# Patient Record
Sex: FEMALE | Race: WHITE | URBAN - METROPOLITAN AREA
[De-identification: names, ages, dates, MRNs, and addresses within clinical notes are randomized per-mention and may not be internally consistent; named-entity substitution may affect disease eponyms.]

---

## 2017-03-22 ENCOUNTER — OFFICE VISIT (OUTPATIENT)
Dept: PHYSICAL MEDICINE AND REHAB | Facility: CLINIC | Age: 47
End: 2017-03-22

## 2017-03-22 VITALS
HEART RATE: 74 BPM | WEIGHT: 143 LBS | BODY MASS INDEX: 25.34 KG/M2 | RESPIRATION RATE: 20 BRPM | TEMPERATURE: 98.4 F | SYSTOLIC BLOOD PRESSURE: 118 MMHG | OXYGEN SATURATION: 96 % | DIASTOLIC BLOOD PRESSURE: 43 MMHG | HEIGHT: 63 IN

## 2017-03-22 DIAGNOSIS — G24.1 DYSTONIA, TORSION, FRAGMENTS OF: ICD-10-CM

## 2017-03-22 DIAGNOSIS — G24.3 SPASMODIC TORTICOLLIS: Primary | ICD-10-CM

## 2017-03-22 NOTE — PROGRESS NOTES
"BOTULINUM TOXIN PROCEDURE NOTE    Chief Complaint   Patient presents with     RECHECK     UMP- MYOBLOCK-CERVICAL DYSTONIA       /43 (BP Location: Left arm, Patient Position: Chair, Cuff Size: Adult Large)  Pulse 74  Temp 98.4  F (36.9  C) (Oral)  Resp 20  Ht 1.6 m (5' 3\")  Wt 64.9 kg (143 lb)  SpO2 96%  Breastfeeding? No  BMI 25.33 kg/m2      Current Outpatient Prescriptions:      RimabotulinumtoxinB (MYOBLOC IM), Inject 7,500 Units into the muscle once, Disp: , Rfl:      Allergies   Allergen Reactions     No Known Allergies         PHYSICAL EXAM:  HEAD, NECK AND TRUNK DYSTONIC PATTERN:    Tremor: Not Observed   Head & Neck Flexion: Present (this is a change from her previous pattern which included more retrocollis)  Forward Head: Present - Mild  Lateral Bend: To the Right  Head & Neck Rotation: Right - significant.      HPI:    Patient denies new medical diagnoses, illnesses, hospitalizations, emergency room visits, and injuries since the previous injection with botulinum neurotoxin.    We reviewed the recommended safety guidelines for  Myobloc from any vaccine injection, such as the seasonal flu vaccine, by a minimum of 10-14 days with Ellen Madrigal. She acknowledged understanding.    RESPONSE TO PREVIOUS TREATMENT:    Ellen Madrigal received 7500 units of Myobloc on 11/21/2016.    Problems following the previous series of neurotoxin injections included:  Dry mouth:  Rated as 'Mild' severity.  Duration: 1-2 weeks then fully resolved.  GI Upset:  Rated as 'Mild' severity, Duration:  102 weeks then fully resolved.      BENEFITS BY PATIENT REPORT:    Pain Improvement: No significant improvement in pain noted.    Dystonia Improvement: Yes, Rated at ~10% - especially noted improvement in the posterior neck muscle stiffness, Duration of Effect:  Unable to determine.  Patient reports continuing dystonic pulling into left rotation.      BOTULINUM NEUROTOXIN INJECTION PROCEDURES:    VERIFICATION OF " PATIENT IDENTIFICATION AND PROCEDURE     Initials   Patient Name tlb   Patient  tlb   Procedure Verified by: tlb     Prior to the start of the procedure and with procedural staff participation, I verbally confirmed the patient s identity using two indicators, relevant allergies, that the procedure was appropriate and matched the consent or emergent situation, and that the correct equipment/implants were available. Immediately prior to starting the procedure I conducted the Time Out with the procedural staff and re-confirmed the patient s name, procedure, and site/side. 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      Although she received some limited benefit from her initial dose of 7500 units of Myobloc, we will increase today to 20850 units of Myobloc in the hopes of providing better response.  We discussed this dosage increase with Ms. Madrigal.  She acknowledged understanding and is in agreement with this plan.     Her baseline symptoms have been recalcitrant to oral medications and conservative therapy. She is here today for a re-evaluation and it was decided to proceed with another series of injections of Myobloc.       TOTAL DOSE ADMINISTERED:  Dose Administered:  91732 units Myobloc    Diluent Used:  Preservative Free Normal Saline  Total Volume of Diluent Used:  2 ml  Lot # [id number], with Expiration  Date:  11/2019 (2500 u Myobloc = 2 vials)  Lot # [id number], with Expiration Date:  02/2019 (5000 u Myobloc = 1 vial)  NDC #: Myobloc 2500u ([address]-17)  Myobloc 5000u ([address]-10)    Medication guide was offered to patient and was decline at today's visit.    CONSENT:  The risks, benefits, and treatment options were discussed with Ellen Madrigal and she agreed to proceed.      Written consent was obtained by TLB.     EQUIPMENT USED:  Needle-37mm stimulating/recording  EMG/NCS Machine    SKIN PREPARATION:  Skin preparation was performed using an alcohol wipe.    GUIDANCE DESCRIPTION:  Electro-myographic guidance was necessary throughout the procedure to accurately identify all areas of dystonic muscles while avoiding injection of non-dystonic muscles, neighboring nerves and nearby vascular structures.       AREA/MUSCLE INJECTED:  78416 of Myobloc  1. HEAD & NECK MUSCLES: 47525 units of Myobloc      Right Occipitalis - 0.2 cc of Myobloc at 1 site.                Right Mid-Trapezius - 0.3cc of Myobloc at 1 site/s.  Left Mid-Trapezius - 0.1 cc of Myobloc at 1 site/s.     Right Splenius Cervicis - 0.5 cc of Myobloc at 2 site/s.    Right Levator Scapulae - 0.3 cc of Myobloc at 1 site/s (shoulder muscles).   Left Levator Scapulae - 0.1 cc of Myobloc at 2 site/s (shoulder muscles).     Right Inferior Obliquus Capitis - 0.5 cc of Myobloc at 2 site/s. (++EMG activity)    RESPONSE TO PROCEDURE:  Ellen Madrigal tolerated the procedure well and there were no immediate complications.  She was allowed to recover for an appropriate period of time and was discharged home in stable condition.    FOLLOW UP:  Ellen Madrigal was asked to follow up by phone in 7-14 days with Michaela Zhang PT, Care Coordinator or Jumana Gamez RN, Care Coordinator, to report her response to this series of injections.  Re injection will be based on response to this series of injections with Myobloc.    PLAN (Medication Changes, Therapy Orders, Work or  Disability Issues, etc.): Patient will continue to monitor her response and will follow up as noted above.

## 2017-03-22 NOTE — LETTER
"3/22/2017       RE: Ellen Madrigal  [address] [place] [address]     Dear Colleague,    Thank you for referring your patient, Ellen Madrigal, to the [facility] PHYSICAL MEDICINE AND REHABILITATION at [facility]. Please see a copy of my visit note below.    BOTULINUM TOXIN PROCEDURE NOTE    Chief Complaint   Patient presents with     RECHECK     UMP- MYOBLOCK-CERVICAL DYSTONIA       /43 (BP Location: Left arm, Patient Position: Chair, Cuff Size: Adult Large)  Pulse 74  Temp 98.4  F (36.9  C) (Oral)  Resp 20  Ht 1.6 m (5' 3\")  Wt 64.9 kg (143 lb)  SpO2 96%  Breastfeeding? No  BMI 25.33 kg/m2      Current Outpatient Prescriptions:      RimabotulinumtoxinB (MYOBLOC IM), Inject 7,500 Units into the muscle once, Disp: , Rfl:      Allergies   Allergen Reactions     No Known Allergies         PHYSICAL EXAM:  HEAD, NECK AND TRUNK DYSTONIC PATTERN:    Tremor: Not Observed   Head & Neck Flexion: Present (this is a change from her previous pattern which included more retrocollis)  Forward Head: Present - Mild  Lateral Bend: To the Right  Head & Neck Rotation: Right - significant.      HPI:    Patient denies new medical diagnoses, illnesses, hospitalizations, emergency room visits, and injuries since the previous injection with botulinum neurotoxin.    We reviewed the recommended safety guidelines for  Myobloc from any vaccine injection, such as the seasonal flu vaccine, by a minimum of 10-14 days with Ellen Madrigal. She acknowledged understanding.    RESPONSE TO PREVIOUS TREATMENT:    Ellen Madrigal received 7500 units of Myobloc on 11/21/2016.    Problems following the previous series of neurotoxin injections included:  Dry mouth:  Rated as 'Mild' severity.  Duration: 1-2 weeks then fully resolved.  GI Upset:  Rated as 'Mild' severity, Duration:  102 weeks then fully resolved.      BENEFITS BY PATIENT REPORT:    Pain Improvement: No significant improvement in pain " noted.    Dystonia Improvement: Yes, Rated at ~10% - especially noted improvement in the posterior neck muscle stiffness, Duration of Effect:  Unable to determine.  Patient reports continuing dystonic pulling into left rotation.      BOTULINUM NEUROTOXIN INJECTION PROCEDURES:    VERIFICATION OF PATIENT IDENTIFICATION AND PROCEDURE     Initials   Patient Name tlb   Patient  tlb   Procedure Verified by: tlb     Prior to the start of the procedure and with procedural staff participation, I verbally confirmed the patient s identity using two indicators, relevant allergies, that the procedure was appropriate and matched the consent or emergent situation, and that the correct equipment/implants were available. Immediately prior to starting the procedure I conducted the Time Out with the procedural staff and re-confirmed the patient s name, procedure, and site/side. 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      Although she received some limited benefit from her initial dose of 7500 units of Myobloc, we will increase today to 14485 units of Myobloc in the hopes of providing better response.  We discussed this dosage increase with Ms. Madrigal.  She acknowledged understanding and is in agreement with this plan.     Her baseline symptoms have been recalcitrant to oral medications and conservative therapy. She is  here today for a re-evaluation and it was decided to proceed with another series of injections of Myobloc.       TOTAL DOSE ADMINISTERED:  Dose Administered:  36497 units Myobloc    Diluent Used:  Preservative Free Normal Saline  Total Volume of Diluent Used:  2 ml  Lot # [id number], with Expiration Date:  11/2019 (2500 u Myobloc = 2 vials)  Lot # [id number], with Expiration Date:  02/2019 (5000 u Myobloc = 1 vial)  NDC #: Myobloc 2500u ([address]-44)  Myobloc 5000u ([address]-10)    Medication guide was offered to patient and was decline at today's visit.    CONSENT:  The risks, benefits, and treatment options were discussed with Ellen Madrigal and she agreed to proceed.      Written consent was obtained by TLB.     EQUIPMENT USED:  Needle-37mm stimulating/recording  EMG/NCS Machine    SKIN PREPARATION:  Skin preparation was performed using an alcohol wipe.    GUIDANCE DESCRIPTION:  Electro-myographic guidance was necessary throughout the procedure to accurately identify all areas of dystonic muscles while avoiding injection of non-dystonic muscles, neighboring nerves and nearby vascular structures.       AREA/MUSCLE INJECTED:  20569 of Myobloc  1. HEAD & NECK MUSCLES: 68992 units of Myobloc      Right Occipitalis - 0.2 cc of Myobloc at 1 site.                Right Mid-Trapezius - 0.3cc of Myobloc at 1 site/s.  Left Mid-Trapezius - 0.1 cc of Myobloc at 1 site/s.     Right Splenius Cervicis - 0.5 cc of Myobloc at 2 site/s.    Right Levator Scapulae - 0.3 cc of Myobloc at 1 site/s (shoulder muscles).   Left Levator Scapulae - 0.1 cc of Myobloc at 2 site/s (shoulder muscles).     Right Inferior Obliquus Capitis - 0.5 cc of Myobloc at 2 site/s. (++EMG activity)    RESPONSE TO PROCEDURE:  Ellen Madrigal tolerated the procedure well and there were no immediate complications.  She was allowed to recover for an appropriate period of time and was discharged home in stable condition.    FOLLOW UP:  Ellen Madrigal was asked to follow up by  phone in 7-14 days with Michaela Zhang PT, Care Coordinator or Jumana Gamez RN, Care Coordinator, to report her response to this series of injections.  Re injection will be based on response to this series of injections with Myobloc.    PLAN (Medication Changes, Therapy Orders, Work or Disability Issues, etc.): Patient will continue to monitor her response and will follow up as noted above.          Again, thank you for allowing me to participate in the care of your patient.      Sincerely,    Elmo Laura MD

## 2017-03-22 NOTE — MR AVS SNAPSHOT
After Visit Summary   3/22/2017    Ellen Madrigal    MRN: [id number]           Patient Information     Date Of Birth          1970        Visit Information        Provider Department      3/22/2017 3:40 PM Elmo Laura MD [facility] Physical Medicine and Rehabilitation         Follow-ups after your visit        Follow-up notes from your care team     Return in about 3 months (around 6/22/2017) for Cervical Dystonia.      Your next 10 appointments already scheduled     Jun 21, 2017  1:40 PM CDT   (Arrive by 1:25 PM)   Return Botox with Elmo Laura MD   [facility] Physical Medicine and Rehabilitation ([facility])    [address]  [address] [facility] [address] [phone]              Who to contact     Please call your clinic at [phone] to:    Ask questions about your health    Make or cancel appointments    Discuss your medicines    Learn about your test results    Speak to your doctor   If you have compliments or concerns about an experience at your clinic, or if you wish to file a complaint, please contact [facility] Patient Relations at [phone] or email us at [email or web address]         Additional Information About Your Visit        MyChart Information     [employer] gives you secure access to your electronic health record. If you see a primary care provider, you can also send messages to your care team and make appointments. If you have questions, please call your primary care clinic.  If you do not have a primary care provider, please call [phone] and they will assist you.      BlogBus is an electronic gateway that provides easy, online access to your medical records. With Blog[employer], you can request a clinic appointment, read your test results, renew a prescription or communicate with your care team.     To access your existing account, please contact your [facility]  "Clinic or call [phone] for assistance.        Care EveryWhere ID     This is your Care EveryWhere ID. This could be used by other organizations to access your [place] medical records  [id number]        Your Vitals Were     Pulse Temperature Respirations Height Pulse Oximetry Breastfeeding?    74 98.4  F (36.9  C) (Oral) 20 1.6 m (5' 3\") 96% No    BMI (Body Mass Index)                   25.33 kg/m2            Blood Pressure from Last 3 Encounters:   03/22/17 118/43   11/21/16 145/48   10/27/16 116/58    Weight from Last 3 Encounters:   03/22/17 64.9 kg (143 lb)   01/28/16 65.2 kg (143 lb 12.8 oz)   01/07/14 63.5 kg (140 lb)              Today, you had the following     No orders found for display       Primary Care Provider    None Specified       No primary provider on file.        Thank you!     Thank you for choosing [facility] PHYSICAL MEDICINE AND REHABILITATION  for your care. Our goal is always to provide you with excellent care. Hearing back from our patients is one way we can continue to improve our services. Please take a few minutes to complete the written survey that you may receive in the mail after your visit with us. Thank you!             Your Updated Medication List - Protect others around you: Learn how to safely use, store and throw away your medicines at www.disposemymeds.org.          This list is accurate as of: 3/22/17  4:13 PM.  Always use your most recent med list.                   Brand Name Dispense Instructions for use    * MYOBLOC IM      Inject 7,500 Units into the muscle once       * MYOBLOC IM      Inject 10,000 Units into the muscle once Lot # [id number], with Expiration Date:  11/2019 (2500 u Myobloc = 2 vials) Lot # [id number], with Expiration Date:  02/2019 (5000 u Myobloc = 1 vial)       * Notice:  This list has 2 medication(s) that are the same as other medications prescribed for you. Read the directions carefully, and ask your doctor or other care provider to review them with " you.

## 2017-03-22 NOTE — LETTER
Date:March 23, 2017      Patient was self referred, no letter generated. Do not send.        [facility] Health Information

## 2017-06-17 ENCOUNTER — HEALTH MAINTENANCE LETTER (OUTPATIENT)
Age: 47
End: 2017-06-17

## 2017-06-21 ENCOUNTER — OFFICE VISIT (OUTPATIENT)
Dept: PHYSICAL MEDICINE AND REHAB | Facility: CLINIC | Age: 47
End: 2017-06-21

## 2017-06-21 VITALS
DIASTOLIC BLOOD PRESSURE: 56 MMHG | HEIGHT: 63 IN | BODY MASS INDEX: 25.64 KG/M2 | SYSTOLIC BLOOD PRESSURE: 116 MMHG | TEMPERATURE: 98.4 F | HEART RATE: 86 BPM | WEIGHT: 144.7 LBS

## 2017-06-21 DIAGNOSIS — G24.3 SPASMODIC TORTICOLLIS: Primary | ICD-10-CM

## 2017-06-21 DIAGNOSIS — G24.1 DYSTONIA, TORSION, FRAGMENTS OF: ICD-10-CM

## 2017-06-21 ASSESSMENT — PAIN SCALES - GENERAL: PAINLEVEL: NO PAIN (0)

## 2017-06-21 NOTE — MR AVS SNAPSHOT
After Visit Summary   6/21/2017    Ellen Madrigal    MRN: [id number]           Patient Information     Date Of Birth          1970        Visit Information        Provider Department      6/21/2017 1:40 PM Elmo Laura MD [facility] Physical Medicine and Rehabilitation        Today's Diagnoses     Spasmodic torticollis    -  1    Dystonia, torsion, fragments of           Follow-ups after your visit        Follow-up notes from your care team     Return in about 12 weeks (around 9/13/2017) for Cervical dystonia.      Your next 10 appointments already scheduled     Sep 14, 2017  2:30 PM CDT   (Arrive by 2:15 PM)   Return Botox with Elmo Laura MD   [facility] Physical Medicine and Rehabilitation ([facility])    [address] [address] [phone]              Who to contact     Please call your clinic at [phone] to:    Ask questions about your health    Make or cancel appointments    Discuss your medicines    Learn about your test results    Speak to your doctor   If you have compliments or concerns about an experience at your clinic, or if you wish to file a complaint, please contact [facility] Patient Relations at [phone] or email us at [email or web address]         Additional Information About Your Visit        MyChart Information     [employer] gives you secure access to your electronic health record. If you see a primary care provider, you can also send messages to your care team and make appointments. If you have questions, please call your primary care clinic.  If you do not have a primary care provider, please call [phone] and they will assist you.      Reble is an electronic gateway that provides easy, online access to your medical records. With [employer], you can request a clinic appointment, read your test results, renew a prescription or communicate with your care  "team.     To access your existing account, please contact your [facility] Clinic or call [phone] for assistance.        Care EveryWhere ID     This is your Care EveryWhere ID. This could be used by other organizations to access your [place] medical records  [id number]        Your Vitals Were     Pulse Temperature Height BMI (Body Mass Index)          86 98.4  F (36.9  C) (Oral) 1.6 m (5' 3\") 25.63 kg/m2         Blood Pressure from Last 3 Encounters:   06/21/17 116/56   03/22/17 118/43   11/21/16 145/48    Weight from Last 3 Encounters:   06/21/17 65.6 kg (144 lb 11.2 oz)   03/22/17 64.9 kg (143 lb)   01/28/16 65.2 kg (143 lb 12.8 oz)              We Performed the Following     HC CHEMODENERVATION 1 EXTREMITY, EA ADDL EXTREMITY, 1-4 MUSCLE     HC CHEMODENERVATION MUSCLE NECK UNILAT     HC CHEMODENERVATION ONE EXTREMITY, 1-4 MUSCLE     Needle EMG Guide w/Chemodenervation (48511)        Primary Care Provider    None Specified       No primary provider on file.        Equal Access to Services     RINA ROLAND : Hadmatthew Lopez, jumana garrido, chelsy prince . [facility] [phone].    ATENCIÓN: Si habla español, tiene a hart disposición servicios gratuitos de asistencia lingüística. [facility] [phone].    We comply with applicable federal civil rights laws and [place] laws. We do not discriminate on the basis of race, color, national origin, age, disability sex, sexual orientation or gender identity.            Thank you!     Thank you for choosing [facility] PHYSICAL MEDICINE AND REHABILITATION  for your care. Our goal is always to provide you with excellent care. Hearing back from our patients is one way we can continue to improve our services. Please take a few minutes to complete the written survey that you may receive in the mail after your visit with us. Thank you!             Your Updated Medication List - Protect " others around you: Learn how to safely use, store and throw away your medicines at www.disposemymeds.org.          This list is accurate as of: 6/21/17  2:59 PM.  Always use your most recent med list.                   Brand Name Dispense Instructions for use Diagnosis    * MYOBLOC IM      Inject 7,500 Units into the muscle once        * MYOBLOC IM      Inject 10,000 Units into the muscle once Lot # [id number], with Expiration Date:  11/2019 (2500 u Myobloc = 2 vials) Lot # [id number], with Expiration Date:  02/2019 (5000 u Myobloc = 1 vial)        * MYOBLOC IM      Inject 12,500 Units into the muscle once Lot#: [id number] (2500 unit vial) Exp: 11/2019 Lot#: [id number] (5000 unit vial) Exp: 02/2019        * Notice:  This list has 3 medication(s) that are the same as other medications prescribed for you. Read the directions carefully, and ask your doctor or other care provider to review them with you.

## 2017-06-21 NOTE — PROGRESS NOTES
"BOTULINUM TOXIN PROCEDURE NOTE    Chief Complaint   Patient presents with     RECHECK     [facility] RETURN - BOTOX       /56 (BP Location: Left arm, Patient Position: Sitting, Cuff Size: Adult Regular)  Pulse 86  Temp 98.4  F (36.9  C) (Oral)  Ht 1.6 m (5' 3\")  Wt 65.6 kg (144 lb 11.2 oz)  BMI 25.63 kg/m2      Current Outpatient Prescriptions:      RimabotulinumtoxinB (MYOBLOC IM), Inject 10,000 Units into the muscle once Lot # [id number], with Expiration Date:  2019 (2500 u Myobloc = 2 vials) Lot # [id number], with Expiration Date:  2019 (5000 u Myobloc = 1 vial), Disp: , Rfl:      RimabotulinumtoxinB (MYOBLOC IM), Inject 7,500 Units into the muscle once, Disp: , Rfl:      Allergies   Allergen Reactions     No Known Allergies         PHYSICAL EXAM:  HEAD, NECK AND TRUNK DYSTONIC PATTERN:    Tremor: Not Observed   Head & Neck Flexion: Present (this is a change from her previous pattern which included more retrocollis)  Forward Head: Present - Mild  Lateral Bend: To the Right  Head & Neck Rotation: Right - significant.      HPI:    Patient denies new medical diagnoses, illnesses, hospitalizations, emergency room visits, and injuries since the previous injection with botulinum neurotoxin.    We reviewed the recommended safety guidelines for  Myobloc from any vaccine injection, such as the seasonal flu vaccine, by a minimum of 10-14 days with Ellen Madrigal. She acknowledged understanding.    RESPONSE TO PREVIOUS TREATMENT:    Ellen Madrigal received 10,000 units of Myobloc on 17.    Problems following the previous series of neurotoxin injections included:  No problems reported      BENEFITS BY PATIENT REPORT:    Pain Improvement: N/A     Dystonia Improvement: Yes, 100%. Duration of Effect: 5-6 weeks.     BOTULINUM NEUROTOXIN INJECTION PROCEDURES:    VERIFICATION OF PATIENT IDENTIFICATION AND PROCEDURE     Initials   Patient Name [facility]   Patient  [facility]   Procedure Verified by: [facility]     Prior to the start of " the procedure and with procedural staff participation, I verbally confirmed the patient s identity using two indicators, relevant allergies, that the procedure was appropriate and matched the consent or emergent situation, and that the correct equipment/implants were available. Immediately prior to starting the procedure I conducted the Time Out with the procedural staff and re-confirmed the patient s name, procedure, and site/side. (The Joint Commission universal protocol was followed.)  Yes    Sedation (Moderate or Deep): None      Above assessments performed by:  Resident/Fellow         Rachel Kelsey,            The attending provider was present for the entire procedure documented below.      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      She received some limited benefit from her initial dose of 7500 units of Myobloc, then had great results with an increase to 61469 units of Myobloc, with results lasting about 5-6 weeks.  We will increase to 12,500 units on 06/21/17 to see if there is an increased duration of benefit. Pt indicates understanding and is in agreement with the plan.      Her baseline symptoms have been recalcitrant to oral medications and conservative therapy. She is here today for a re-evaluation and it was decided to proceed with another series of injections of Myobloc.       TOTAL DOSE ADMINISTERED:  Dose Administered:  12,500 units Myobloc    Diluent Used:  Preservative Free Normal Saline  Total Volume of Diluent Used:  2.5 ml  Lot # [id number], with  Expiration Date:  11/2019 (2500 u Myobloc = 1 vial)  Lot # [id number], with Expiration Date:  02/2019 (5000 u Myobloc = 2 vials)  NDC #: Myobloc 2500u ([address]-77)  Myobloc 5000u ([address]-10)    Medication guide was offered to patient and was decline at today's visit.    CONSENT:  The risks, benefits, and treatment options were discussed with Ellen Madrigal and she agreed to proceed.      Written consent was obtained by [facility].     EQUIPMENT USED:  Needle-37mm stimulating/recording  EMG/NCS Machine    SKIN PREPARATION:  Skin preparation was performed using an alcohol wipe.    GUIDANCE DESCRIPTION:  Electro-myographic guidance was necessary throughout the procedure to accurately identify all areas of dystonic muscles while avoiding injection of non-dystonic muscles, neighboring nerves and nearby vascular structures.       AREA/MUSCLE INJECTED:  12,500 units of Myobloc  1. HEAD & NECK MUSCLES: 12,500 units of Myobloc      Right Occipitalis - 0.2 cc of Myobloc at 1 site.                Right Mid-Trapezius - 0.3cc of Myobloc at 1 site/s.  Left Mid-Trapezius - 0.1 cc of Myobloc at 1 site/s.     Right Splenius Cervicis - 0.5 cc of Myobloc at 2 site/s.  Left Splenius Cervicis - 0.2 cc of Myobloc at 2 site/s.      Right Levator Scapulae - 0.3 cc of Myobloc at 1 site/s (shoulder muscles).   Left Levator Scapulae - 0.2 cc of Myobloc at 2 site/s (shoulder muscles).    Right Inf Oblique - 0.5 cc of Myobloc at 1 site    Left Longissimus - 0.2 cc of Myobloc at 1 site    RESPONSE TO PROCEDURE:  Ellen Madrigal tolerated the procedure well and there were no immediate complications.  She was allowed to recover for an appropriate period of time and was discharged home in stable condition.    FOLLOW UP:  Ellen Madrigal was asked to follow up by phone in 7-14 days with Michaela Zhang PT, Care Coordinator or Jumana Gamez RN, Care Coordinator, to report her response to this series of injections.  Patient will be rescheduled for 12 weeks.      PLAN (Medication Changes, Therapy Orders, Work or Disability Issues, etc.): Patient will continue to monitor her response and will follow up as noted above.

## 2017-06-21 NOTE — LETTER
"6/21/2017       RE: Ellen Madrigal  [address] [place] [address]     Dear Colleague,    Thank you for referring your patient, Ellen Madrigal, to the [facility] PHYSICAL MEDICINE AND REHABILITATION at [facility]. Please see a copy of my visit note below.    BOTULINUM TOXIN PROCEDURE NOTE    Chief Complaint   Patient presents with     RECHECK     [facility] RETURN - BOTOX       /56 (BP Location: Left arm, Patient Position: Sitting, Cuff Size: Adult Regular)  Pulse 86  Temp 98.4  F (36.9  C) (Oral)  Ht 1.6 m (5' 3\")  Wt 65.6 kg (144 lb 11.2 oz)  BMI 25.63 kg/m2      Current Outpatient Prescriptions:      RimabotulinumtoxinB (MYOBLOC IM), Inject 10,000 Units into the muscle once Lot # [id number], with Expiration Date:  11/2019 (2500 u Myobloc = 2 vials) Lot # [id number], with Expiration Date:  02/2019 (5000 u Myobloc = 1 vial), Disp: , Rfl:      RimabotulinumtoxinB (MYOBLOC IM), Inject 7,500 Units into the muscle once, Disp: , Rfl:      Allergies   Allergen Reactions     No Known Allergies         PHYSICAL EXAM:  HEAD, NECK AND TRUNK DYSTONIC PATTERN:    Tremor: Not Observed   Head & Neck Flexion: Present (this is a change from her previous pattern which included more retrocollis)  Forward Head: Present - Mild  Lateral Bend: To the Right  Head & Neck Rotation: Right - significant.    HPI:  Patient denies new medical diagnoses, illnesses, hospitalizations, emergency room visits, and injuries since the previous injection with botulinum neurotoxin.    We reviewed the recommended safety guidelines for  Myobloc from any vaccine injection, such as the seasonal flu vaccine, by a minimum of 10-14 days with Ellen Madrigal. She acknowledged understanding.    RESPONSE TO PREVIOUS TREATMENT:    Ellen Madrigal received 10,000 units of Myobloc on 03/22/17.    Problems following the previous series of neurotoxin injections included:  No problems reported      BENEFITS BY PATIENT REPORT:    Pain " Improvement: N/A     Dystonia Improvement: Yes, 100%. Duration of Effect: 5-6 weeks.     BOTULINUM NEUROTOXIN INJECTION PROCEDURES:  VERIFICATION OF PATIENT IDENTIFICATION AND PROCEDURE     Initials   Patient Name kw   Patient  [facility]   Procedure Verified by: [facility]     Prior to the start of the procedure and with procedural staff participation, I verbally confirmed the patient s identity using two indicators, relevant allergies, that the procedure was appropriate and matched the consent or emergent situation, and that the correct equipment/implants were available. Immediately prior to starting the procedure I conducted the Time Out with the procedural staff and re-confirmed the patient s name, procedure, and site/side. (The Joint Commission universal protocol was followed.)  Yes    Sedation (Moderate or Deep): None      Above assessments performed by:  Resident/Fellow         Rachel Kelsey DO           The attending provider was present for the entire procedure documented below.    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      She received some limited benefit from her initial dose of 7500 units of Myobloc, then had great results with an increase to 28420 units of Myobloc, with results lasting about 5-6 weeks.  We will increase to 12,500 units on 17 to see if there is an increased duration of benefit. Pt indicates understanding and is in agreement with the plan.      Her baseline symptoms have been recalcitrant to oral medications and conservative therapy. She is here today  for a re-evaluation and it was decided to proceed with another series of injections of Myobloc.       TOTAL DOSE ADMINISTERED:  Dose Administered:  12,500 units Myobloc    Diluent Used:  Preservative Free Normal Saline  Total Volume of Diluent Used:  2.5 ml  Lot # [id number], with Expiration Date:  11/2019 (2500 u Myobloc = 1 vial)  Lot # [id number], with Expiration Date:  02/2019 (5000 u Myobloc = 2 vials)  NDC #: Myobloc 2500u ([address]-62)  Myobloc 5000u ([address]-10)    Medication guide was offered to patient and was decline at today's visit.    CONSENT:  The risks, benefits, and treatment options were discussed with Ellen Madrigal and she agreed to proceed.      Written consent was obtained by [facility].     EQUIPMENT USED:  Needle-37mm stimulating/recording  EMG/NCS Machine    SKIN PREPARATION:  Skin preparation was performed using an alcohol wipe.    GUIDANCE DESCRIPTION:  Electro-myographic guidance was necessary throughout the procedure to accurately identify all areas of dystonic muscles while avoiding injection of non-dystonic muscles, neighboring nerves and nearby vascular structures.       AREA/MUSCLE INJECTED:  12,500 units of Myobloc  1. HEAD & NECK MUSCLES: 12,500 units of Myobloc      Right Occipitalis - 0.2 cc of Myobloc at 1 site.                Right Mid-Trapezius - 0.3cc of Myobloc at 1 site/s.  Left Mid-Trapezius - 0.1 cc of Myobloc at 1 site/s.     Right Splenius Cervicis - 0.5 cc of Myobloc at 2 site/s.  Left Splenius Cervicis - 0.2 cc of Myobloc at 2 site/s.      Right Levator Scapulae - 0.3 cc of Myobloc at 1 site/s (shoulder muscles).   Left Levator Scapulae - 0.2 cc of Myobloc at 2 site/s (shoulder muscles).    Right Inf Oblique - 0.5 cc of Myobloc at 1 site    Left Longissimus - 0.2 cc of Myobloc at 1 site    RESPONSE TO PROCEDURE:  Ellen Madrigal tolerated the procedure well and there were no immediate complications.  She was allowed to recover for an appropriate period of time and was discharged home  in stable condition.    FOLLOW UP:  Ellen Madrigal was asked to follow up by phone in 7-14 days with Michaela Zhang PT, Care Coordinator or Jumana Gamez RN, Care Coordinator, to report her response to this series of injections.  Patient will be rescheduled for 12 weeks.     PLAN (Medication Changes, Therapy Orders, Work or Disability Issues, etc.): Patient will continue to monitor her response and will follow up as noted above.    Again, thank you for allowing me to participate in the care of your patient.      Sincerely,    Elmo Laura MD

## 2017-06-29 ENCOUNTER — CARE COORDINATION (OUTPATIENT)
Dept: PHYSICAL MEDICINE AND REHAB | Facility: CLINIC | Age: 47
End: 2017-06-29

## 2017-06-29 NOTE — PROGRESS NOTES
Ellen Madrigal called to report on her response to receiving injections of 12,500 units of Myobloc, into her head and neck muscles on 6/21/2017 by Dr. Elmo Laura for management of Spasmodic Torticollis.   She is now 8 days post injection.    SIDE-EFFECTS: Pt reports she had trouble swallowing today at lunch.  States she was eating a chicken sandwich and felt she couldn't get food to move down as she swallowed.  This is her 3rd series of injections with Myobloc and dose had been increased slightly.  Denies that this side effect has ever happened before.    BENEFITS: None reported yet    COMMENTS: Reassured Pt that this side effect is weakening of swallowing muscles and it is temporary and will wear off.  Encouraged Pt to sit upright at 90 degrees when eating, take very small bites of dry foods especially meat as it tends to swell during chewing and have plenty to drink available when eating.  Watch out for breads and crackers which tend to be dry.  Pt stated she understood and felt less anxious.  Dr. Laura notified and called Pt as well.     Dr. Laura was notified of this call.

## 2017-08-28 ENCOUNTER — OFFICE VISIT (OUTPATIENT)
Dept: PHYSICAL MEDICINE AND REHAB | Facility: CLINIC | Age: 47
End: 2017-08-28

## 2017-08-28 VITALS
SYSTOLIC BLOOD PRESSURE: 144 MMHG | BODY MASS INDEX: 25.69 KG/M2 | WEIGHT: 145 LBS | TEMPERATURE: 97.6 F | HEIGHT: 63 IN | DIASTOLIC BLOOD PRESSURE: 64 MMHG | HEART RATE: 72 BPM

## 2017-08-28 DIAGNOSIS — G24.3 SPASMODIC TORTICOLLIS: Primary | ICD-10-CM

## 2017-08-28 DIAGNOSIS — G24.1 DYSTONIA, TORSION, FRAGMENTS OF: ICD-10-CM

## 2017-08-28 ASSESSMENT — PAIN SCALES - GENERAL: PAINLEVEL: NO PAIN (0)

## 2017-08-28 NOTE — PROGRESS NOTES
BOTULINUM TOXIN PROCEDURE NOTE    No chief complaint on file.    Dystonia [facility] RETURN - Myobloc Cervical Dystonia     There were no vitals taken for this visit.      Current Outpatient Prescriptions:      RimabotulinumtoxinB (MYOBLOC IM), Inject 12,500 Units into the muscle once Lot#: [id number] (2500 unit vial) Exp: 11/2019 Lot#: [id number] (5000 unit vial) Exp: 02/2019, Disp: , Rfl:      RimabotulinumtoxinB (MYOBLOC IM), Inject 10,000 Units into the muscle once Lot # [id number], with Expiration Date:  11/2019 (2500 u Myobloc = 2 vials) Lot # [id number], with Expiration Date:  02/2019 (5000 u Myobloc = 1 vial), Disp: , Rfl:      RimabotulinumtoxinB (MYOBLOC IM), Inject 7,500 Units into the muscle once, Disp: , Rfl:      Allergies   Allergen Reactions     No Known Allergies         PHYSICAL EXAM:  HEAD, NECK AND TRUNK DYSTONIC PATTERN:    Tremor: Not Observed   Head & Neck Flexion: Present (this is a change from her previous pattern which included more retrocollis)  Forward Head: Present - Mild  Lateral Bend: To the Right  Head & Neck Rotation: Right - significant.  Right shoulder elevation.    HPI:    Patient denies new medical diagnoses, illnesses, hospitalizations, emergency room visits, and injuries since the previous injection with botulinum neurotoxin.    We reviewed the recommended safety guidelines for  Botox from any vaccine injection, such as the seasonal flu vaccine, by a minimum of 10-14 days with Ellen Madrigal. She acknowledged understanding.    RESPONSE TO PREVIOUS TREATMENT:    Ellen Madrigal received 12,500 units of Myobloc on 6/21/2017.    Problems following the previous series of neurotoxin injections included:  Difficulty swallowing:  Rated as 'Moderate' severity.  Duration: 1 weeks Resolved     BENEFITS BY PATIENT REPORT:    Pain Improvement: Not applicable    Dystonia Improvement: No      BOTULINUM NEUROTOXIN INJECTION PROCEDURES:    VERIFICATION OF PATIENT IDENTIFICATION AND PROCEDURE     Initials    Patient Name lmd   Patient  lmd   Procedure Verified by: lmd       Prior to the start of the procedure and with procedural staff participation, I verbally confirmed the patient s identity using two indicators, relevant allergies, that the procedure was appropriate and matched the consent or emergent situation, and that the correct equipment/implants were available. Immediately prior to starting the procedure I conducted the Time Out with the procedural staff and re-confirmed the patient s name, procedure, and site/side. 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       She received some limited benefit from her initial dose of 7500 units of Myobloc, then had great results with an increase to 84702 units of Myobloc, with results lasting about 5-6 weeks.  Increased to 12,500 units on 17 but had no benefit and side effects of swallowing problems.  Denies choking but effect was disconcerting to patient.     Her baseline symptoms have been recalcitrant to oral medications and conservative therapy. She is here today for a re-evaluation and it was decided to proceed with another series of injections of Myobloc.         TOTAL DOSE ADMINISTERED:  Dose Administered:  12,500 units Myobloc    Diluent Used:  Preservative Free Normal Saline  Total Volume of Diluent Used:  2.5 ml  Lot # [id number], with  Expiration Date:  11/2019 (2500 u Myobloc = 1 vial)  Lot # [id number], with Expiration Date:  02/2019 (5000 u Myobloc = 2 vials)  NDC #: Myobloc 2500u ([address]-49)  Myobloc 5000u ([address]-10)     Medication guide was offered to patient and was decline at today's visit.     CONSENT:  The risks, benefits, and treatment options were discussed with Ellen Madrigal and she agreed to proceed.      Written consent was obtained by lmd.      EQUIPMENT USED:  Needle-37mm stimulating/recording  EMG/NCS Machine     SKIN PREPARATION:  Skin preparation was performed using an alcohol wipe.     GUIDANCE DESCRIPTION:  Electro-myographic guidance was necessary throughout the procedure to accurately identify all areas of dystonic muscles while avoiding injection of non-dystonic muscles, neighboring nerves and nearby vascular structures.         AREA/MUSCLE INJECTED:  12,500 units of Myobloc  1. HEAD & NECK MUSCLES: 12,500 units of Myobloc                  Right Occipitalis - 0.3 cc of Myobloc at 1 site.                 Right Mid-Trapezius - 0.3cc of Myobloc at 1 site/s.  Left Mid-Trapezius - 0.1 cc of Myobloc at 1 site/s.      Right Splenius Cervicis - 0.7 cc of Myobloc at 3 site/s.  Left Splenius Cervicis - 0.1 cc of Myobloc at 2 site/s.        Right Levator Scapulae - 0.3 cc of Myobloc at 1 site/s (shoulder muscles).   Left Levator Scapulae - 0.2 cc of Myobloc at 2 site/s (shoulder muscles).     Right Inf Oblique - 0.3 cc of Myobloc at 1 site     Left Longissimus - 0.2 cc of Myobloc at 1 site     RESPONSE TO PROCEDURE:  Ellen Madrigal tolerated the procedure well and there were no immediate complications.  She was allowed to recover for an appropriate period of time and was discharged home in stable condition.     FOLLOW UP:  Ellen Madrigal was asked to follow up by phone in 7-14 days with Michaela Zhang PT, Care Coordinator or Jumana Gamez RN, Care Coordinator, to report her response to this series of injections.  Patient will be  rescheduled for 12 weeks.      PLAN (Medication Changes, Therapy Orders, Work or Disability Issues, etc.): Patient will continue to monitor her response and will follow up as noted above.

## 2017-08-28 NOTE — LETTER
8/28/2017       RE: Ellen Madrigal  [address] [place] [address]     Dear Colleague,    Thank you for referring your patient, Ellen Madrigal, to the [facility] PHYSICAL MEDICINE AND REHABILITATION at [facility]. Please see a copy of my visit note below.    BOTULINUM TOXIN PROCEDURE NOTE    No chief complaint on file.    Dystonia UMP RETURN - Myobloc Cervical Dystonia     There were no vitals taken for this visit.      Current Outpatient Prescriptions:      RimabotulinumtoxinB (MYOBLOC IM), Inject 12,500 Units into the muscle once Lot#: [id number] (2500 unit vial) Exp: 11/2019 Lot#: [id number] (5000 unit vial) Exp: 02/2019, Disp: , Rfl:      RimabotulinumtoxinB (MYOBLOC IM), Inject 10,000 Units into the muscle once Lot # [id number], with Expiration Date:  11/2019 (2500 u Myobloc = 2 vials) Lot # [id number], with Expiration Date:  02/2019 (5000 u Myobloc = 1 vial), Disp: , Rfl:      RimabotulinumtoxinB (MYOBLOC IM), Inject 7,500 Units into the muscle once, Disp: , Rfl:      Allergies   Allergen Reactions     No Known Allergies         PHYSICAL EXAM:  HEAD, NECK AND TRUNK DYSTONIC PATTERN:    Tremor: Not Observed   Head & Neck Flexion: Present (this is a change from her previous pattern which included more retrocollis)  Forward Head: Present - Mild  Lateral Bend: To the Right  Head & Neck Rotation: Right - significant.  Right shoulder elevation.    HPI:  Patient denies new medical diagnoses, illnesses, hospitalizations, emergency room visits, and injuries since the previous injection with botulinum neurotoxin.    We reviewed the recommended safety guidelines for  Botox from any vaccine injection, such as the seasonal flu vaccine, by a minimum of 10-14 days with Ellen Madrigal. She acknowledged understanding.    RESPONSE TO PREVIOUS TREATMENT:  Ellen Madrigal received 12,500 units of Myobloc on 6/21/2017.    Problems following the previous series of neurotoxin injections included:  Difficulty  swallowing:  Rated as 'Moderate' severity.  Duration: 1 weeks Resolved     BENEFITS BY PATIENT REPORT:  Pain Improvement: Not applicable    Dystonia Improvement: No    BOTULINUM NEUROTOXIN INJECTION PROCEDURES:    VERIFICATION OF PATIENT IDENTIFICATION AND PROCEDURE   Initials   Patient Name lmd   Patient  lmd   Procedure Verified by: lmd       Prior to the start of the procedure and with procedural staff participation, I verbally confirmed the patient s identity using two indicators, relevant allergies, that the procedure was appropriate and matched the consent or emergent situation, and that the correct equipment/implants were available. Immediately prior to starting the procedure I conducted the Time Out with the procedural staff and re-confirmed the patient s name, procedure, and site/side. 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       She received some limited benefit from her initial dose of 7500 units of Myobloc, then had great results with an increase to 56245 units of Myobloc, with results lasting about 5-6 weeks.  Increased to 12,500 units on 17 but had no benefit and side effects of swallowing problems.  Denies choking but effect was disconcerting to patient.     Her baseline symptoms have been recalcitrant to oral medications and conservative therapy. She is here today  for a re-evaluation and it was decided to proceed with another series of injections of Myobloc.      TOTAL DOSE ADMINISTERED:  Dose Administered:  12,500 units Myobloc    Diluent Used:  Preservative Free Normal Saline  Total Volume of Diluent Used:  2.5 ml  Lot # [id number], with Expiration Date:  11/2019 (2500 u Myobloc = 1 vial)  Lot # [id number], with Expiration Date:  02/2019 (5000 u Myobloc = 2 vials)  NDC #: Myobloc 2500u ([address]-20)  Myobloc 5000u ([address]-10)     Medication guide was offered to patient and was decline at today's visit.     CONSENT:  The risks, benefits, and treatment options were discussed with Ellen Madrigal and she agreed to proceed.      Written consent was obtained by lmd.      EQUIPMENT USED:  Needle-37mm stimulating/recording  EMG/NCS Machine     SKIN PREPARATION:  Skin preparation was performed using an alcohol wipe.     GUIDANCE DESCRIPTION:  Electro-myographic guidance was necessary throughout the procedure to accurately identify all areas of dystonic muscles while avoiding injection of non-dystonic muscles, neighboring nerves and nearby vascular structures.         AREA/MUSCLE INJECTED:  12,500 units of Myobloc  1. HEAD & NECK MUSCLES: 12,500 units of Myobloc                  Right Occipitalis - 0.3 cc of Myobloc at 1 site.                 Right Mid-Trapezius - 0.3cc of Myobloc at 1 site/s.  Left Mid-Trapezius - 0.1 cc of Myobloc at 1 site/s.      Right Splenius Cervicis - 0.7 cc of Myobloc at 3 site/s.  Left Splenius Cervicis - 0.1 cc of Myobloc at 2 site/s.        Right Levator Scapulae - 0.3 cc of Myobloc at 1 site/s (shoulder muscles).   Left Levator Scapulae - 0.2 cc of Myobloc at 2 site/s (shoulder muscles).     Right Inf Oblique - 0.3 cc of Myobloc at 1 site     Left Longissimus - 0.2 cc of Myobloc at 1 site     RESPONSE TO PROCEDURE:  Ellen Madrigal tolerated the procedure well and there were no immediate complications.  She was allowed to recover for an appropriate period of time  and was discharged home in stable condition.     FOLLOW UP:  Ellen Madrigal was asked to follow up by phone in 7-14 days with Michaela Zhang PT, Care Coordinator or Jumana Gamez RN, Care Coordinator, to report her response to this series of injections.  Patient will be rescheduled for 12 weeks.      PLAN (Medication Changes, Therapy Orders, Work or Disability Issues, etc.): Patient will continue to monitor her response and will follow up as noted above.    Again, thank you for allowing me to participate in the care of your patient.      Elmo Laura MD

## 2017-11-06 DIAGNOSIS — G24.3 SPASMODIC TORTICOLLIS: Primary | ICD-10-CM

## 2018-02-12 ENCOUNTER — CARE COORDINATION (OUTPATIENT)
Dept: PHYSICAL MEDICINE AND REHAB | Facility: CLINIC | Age: 48
End: 2018-02-12

## 2018-02-12 NOTE — PROGRESS NOTES
Ms. Ellen Madrigal called to report that she had a denervation procedure performed on 02/05/2018 by Dr. Amin, [facility], [place], [place], to address her spasmodic torticollis symptoms which are currently non-responsive to any of the botulinum neurotoxins.      Dr. Laura was notified of this call.

## 2019-10-01 ENCOUNTER — HEALTH MAINTENANCE LETTER (OUTPATIENT)
Age: 49
End: 2019-10-01

## 2019-10-30 ENCOUNTER — HEALTH MAINTENANCE LETTER (OUTPATIENT)
Age: 49
End: 2019-10-30

## 2021-01-15 ENCOUNTER — HEALTH MAINTENANCE LETTER (OUTPATIENT)
Age: 51
End: 2021-01-15

## 2021-01-23 ENCOUNTER — HEALTH MAINTENANCE LETTER (OUTPATIENT)
Age: 51
End: 2021-01-23

## 2021-09-04 ENCOUNTER — HEALTH MAINTENANCE LETTER (OUTPATIENT)
Age: 51
End: 2021-09-04

## 2022-02-19 ENCOUNTER — HEALTH MAINTENANCE LETTER (OUTPATIENT)
Age: 52
End: 2022-02-19

## 2022-10-16 ENCOUNTER — HEALTH MAINTENANCE LETTER (OUTPATIENT)
Age: 52
End: 2022-10-16

## 2023-04-01 ENCOUNTER — HEALTH MAINTENANCE LETTER (OUTPATIENT)
Age: 53
End: 2023-04-01

## 2025-04-05 NOTE — MR AVS SNAPSHOT
After Visit Summary   8/28/2017    Ellen Madrigal    MRN: [id number]           Patient Information     Date Of Birth          1970        Visit Information        Provider Department      8/28/2017 10:20 AM Elmo Laura MD [facility] Physical Medicine and Rehabilitation        Today's Diagnoses     Spasmodic torticollis    -  1    Dystonia, torsion, fragments of           Follow-ups after your visit        Follow-up notes from your care team     Return in about 12 weeks (around 11/20/2017) for Dystonia.      Your next 10 appointments already scheduled     Nov 21, 2017 11:00 AM CST   (Arrive by 10:45 AM)   Return Botox with Elmo Laura MD   [facility] ([facility])    [address] [place] [id number] [phone]            Feb 13, 2018 11:00 AM CST   (Arrive by 10:45 AM)   Return Botox with Elmo Laura MD   [facility] ([facility])    [address] [place] [id number] [phone]              Who to contact     Please call your clinic at [phone] to:    Ask questions about your health    Make or cancel appointments    Discuss your medicines    Learn about your test results    Speak to your doctor   If you have compliments or concerns about an experience at your clinic, or if you wish to file a complaint, please contact [facility] Patient Relations at [phone] or email us at [email or web address]         Additional Information About Your Visit        MyChart Information     [employer] gives you secure access to your electronic health record. If you see a primary care provider, you can also send messages to your care team and make appointments. If you have questions, please call your primary care clinic.  If you do not have a primary care provider, please call  "[phone] and they will assist you.      [employer] is an electronic gateway that provides easy, online access to your medical records. With [employer], you can request a clinic appointment, read your test results, renew a prescription or communicate with your care team.     To access your existing account, please contact your [facility] or call [phone] for assistance.        Care EveryWhere ID     This is your Care EveryWhere ID. This could be used by other organizations to access your [facility] medical records  [id number]        Your Vitals Were     Pulse Temperature Height BMI (Body Mass Index)          72 97.6  F (36.4  C) (Oral) 1.6 m (5' 3\") 25.69 kg/m2         Blood Pressure from Last 3 Encounters:   08/28/17 144/64   06/21/17 116/56   03/22/17 118/43    Weight from Last 3 Encounters:   08/28/17 65.8 kg (145 lb)   06/21/17 65.6 kg (144 lb 11.2 oz)   03/22/17 64.9 kg (143 lb)              We Performed the Following     HC CHEMODENERVATION 1 EXTREMITY, EA ADDL EXTREMITY, 1-4 MUSCLE     HC CHEMODENERVATION MUSCLE NECK UNILAT     HC CHEMODENERVATION ONE EXTREMITY, 1-4 MUSCLE     Needle EMG Guide w/Chemodenervation (73157)        Primary Care Provider    None Specified       No primary provider on file.        Equal Access to Services     RINA ROLAND : Hadii georgi Lopez, waaxda luqadaha, qaybta kaalmada chaka, chelsy riley. So [facility] [phone].    ATENCIÓN: Si habla español, tiene a hart disposición servicios gratuitos de asistencia lingüística. Llame al [phone].    We comply with applicable federal civil rights laws and [place] laws. We do not discriminate on the basis of race, color, national origin, age, disability sex, sexual orientation or gender identity.            Thank you!     Thank you for choosing [facility] PHYSICAL MEDICINE AND REHABILITATION  for your care. Our goal is always to provide you with excellent care. " Hearing back from our patients is one way we can continue to improve our services. Please take a few minutes to complete the written survey that you may receive in the mail after your visit with us. Thank you!             Your Updated Medication List - Protect others around you: Learn how to safely use, store and throw away your medicines at www.disposemymeds.org.          This list is accurate as of: 8/28/17  3:52 PM.  Always use your most recent med list.                   Brand Name Dispense Instructions for use Diagnosis    * MYOBLOC IM      Inject 7,500 Units into the muscle once        * MYOBLOC IM      Inject 10,000 Units into the muscle once Lot # [id number], with Expiration Date:  11/2019 (2500 u Myobloc = 2 vials) Lot # [id number], with Expiration Date:  02/2019 (5000 u Myobloc = 1 vial)        * MYOBLOC IM      Inject 12,500 Units into the muscle once Lot#: [id number] (2500 unit vial) Exp: 11/2019 Lot#: [id number] (5000 unit vial) Exp: 02/2019        * MYOBLOC IM      Inject 12,500 Units into the muscle Lot # [id number]  Exp: 11/2019 (2500 U vial) x1 Lot # [id number]  Exp: 2/2019 (5000 U vials) x2        * Notice:  This list has 4 medication(s) that are the same as other medications prescribed for you. Read the directions carefully, and ask your doctor or other care provider to review them with you.       ED MD